# Patient Record
Sex: FEMALE | Race: WHITE | Employment: UNEMPLOYED | URBAN - METROPOLITAN AREA
[De-identification: names, ages, dates, MRNs, and addresses within clinical notes are randomized per-mention and may not be internally consistent; named-entity substitution may affect disease eponyms.]

---

## 2022-07-15 ENCOUNTER — OFFICE VISIT (OUTPATIENT)
Dept: PRIMARY CARE CLINIC | Age: 37
End: 2022-07-15

## 2022-07-15 VITALS
DIASTOLIC BLOOD PRESSURE: 78 MMHG | HEIGHT: 70 IN | TEMPERATURE: 98.4 F | SYSTOLIC BLOOD PRESSURE: 119 MMHG | OXYGEN SATURATION: 96 % | HEART RATE: 84 BPM | BODY MASS INDEX: 40.09 KG/M2 | WEIGHT: 280 LBS

## 2022-07-15 DIAGNOSIS — Z02.1 PHYSICAL EXAM, PRE-EMPLOYMENT: Primary | ICD-10-CM

## 2022-07-15 PROCEDURE — 99999 PR OFFICE/OUTPT VISIT,PROCEDURE ONLY: CPT | Performed by: FAMILY MEDICINE

## 2022-07-15 ASSESSMENT — PATIENT HEALTH QUESTIONNAIRE - PHQ9
2. FEELING DOWN, DEPRESSED OR HOPELESS: 0
SUM OF ALL RESPONSES TO PHQ QUESTIONS 1-9: 0
SUM OF ALL RESPONSES TO PHQ QUESTIONS 1-9: 0
SUM OF ALL RESPONSES TO PHQ9 QUESTIONS 1 & 2: 0
SUM OF ALL RESPONSES TO PHQ QUESTIONS 1-9: 0
1. LITTLE INTEREST OR PLEASURE IN DOING THINGS: 0
SUM OF ALL RESPONSES TO PHQ QUESTIONS 1-9: 0

## 2022-07-15 NOTE — PROGRESS NOTES
[address] [facility]  [address] [place] [address]  Dept: [phone]  Dept Fax: [phone]    Ronald Blunt is a 31 y.o. female who presents today for her medical conditions/complaintsas noted below. Ronald Blunt is c/o of Other (Employment pe )        HPI:     Patient is here for employment physical, [employer]  Past medical history: None  Medications: None up-to-date  Allergies: Cefprozil  Immunizations: UTD  Vision: wears glasses-prescription UTD  Family medical history: Diabetes        History reviewed. No pertinent past medical history. History reviewed. No pertinent surgical history. Past medical history reviewed and pertinent positives/negatives in the HPI    History reviewed. No pertinent family history. Social History     Tobacco Use    Smoking status: Former     Packs/day: 1.00     Years: 13.00     Pack years: 13.00     Types: Cigarettes     Quit date: 2015     Years since quittin.5    Smokeless tobacco: Current   Substance Use Topics    Alcohol use: Not on file      No current outpatient medications on file. No current facility-administered medications for this visit. Allergies   Allergen Reactions    Cefprozil Hives       Health Maintenance   Topic Date Due    COVID-19 Vaccine (1) Never done    Varicella vaccine (1 of 2 - 2-dose childhood series) Never done    Depression Screen  Never done    HIV screen  Never done    Hepatitis C screen  Never done    DTaP/Tdap/Td vaccine (1 - Tdap) Never done    Cervical cancer screen  Never done    Flu vaccine (1) 2022    Hepatitis A vaccine  Aged Out    Hepatitis B vaccine  Aged Out    Hib vaccine  Aged Out    Meningococcal (ACWY) vaccine  Aged Out    Pneumococcal 0-64 years Vaccine  Aged Out       :      Review of Systems   All other systems reviewed and are negative. Objective:     Physical Exam  Vitals and nursing note reviewed. Constitutional:       Appearance: Normal appearance. She is obese. HENT:      Head: Normocephalic and atraumatic. Right Ear: Hearing, tympanic membrane, ear canal and external ear normal.      Left Ear: Hearing, tympanic membrane, ear canal and external ear normal.      Nose: Nose normal.      Mouth/Throat:      Lips: Pink. Mouth: Mucous membranes are moist.      Pharynx: Oropharynx is clear. Eyes:      Extraocular Movements: Extraocular movements intact. Conjunctiva/sclera: Conjunctivae normal.      Pupils: Pupils are equal, round, and reactive to light. Cardiovascular:      Rate and Rhythm: Normal rate and regular rhythm. Heart sounds: Normal heart sounds. Pulmonary:      Effort: Pulmonary effort is normal.      Breath sounds: Normal breath sounds. Abdominal:      General: Bowel sounds are normal. There is no distension. Palpations: Abdomen is soft. Tenderness: There is no abdominal tenderness. There is no guarding. Musculoskeletal:      Cervical back: Normal range of motion. No muscular tenderness. Skin:     General: Skin is warm and dry. Neurological:      Mental Status: She is alert and oriented to person, place, and time. Mental status is at baseline. Psychiatric:         Mood and Affect: Mood normal.         Behavior: Behavior normal.         Thought Content: Thought content normal.         Judgment: Judgment normal.     /78 (Site: Left Upper Arm, Position: Sitting, Cuff Size: Medium Adult)   Pulse 84   Temp 98.4 °F (36.9 °C) (Tympanic)   Ht 5' 10\" (1.778 m)   Wt 280 lb (127 kg)   SpO2 96%   BMI 40.18 kg/m²     Assessment:       Diagnosis Orders   1. Physical exam, pre-employment            Plan:   Patient is cleared to work pending negative TB testing    No orders of the defined types were placed in this encounter. No orders of the defined types were placed in this encounter. Patient given educational materials - see patient instructions. Discussed use, benefit, and side effects of prescribed medications. All patient questions answered. Pt voiced understanding. Reviewed health maintenance. Instructed to continue current medications, diet and exercise. Patient agreed with treatment plan. Follow up as directed.      Electronicallysigned by Cata Pak MD on 7/15/2022 at 1:13 PM

## 2022-07-18 ENCOUNTER — NURSE ONLY (OUTPATIENT)
Dept: PRIMARY CARE CLINIC | Age: 37
End: 2022-07-18

## 2022-07-18 DIAGNOSIS — Z11.1 ENCOUNTER FOR PPD SKIN TEST READING: Primary | ICD-10-CM

## 2022-07-18 NOTE — PROGRESS NOTES
PPD Reading Note  PPD read and results [facility]. Result: 0 mm induration.   Interpretation: neg  If test not read within 48-72 hours of initial placement, patient advised to repeat in other arm 1-3 weeks after this test.  Allergic reaction: no